# Patient Record
Sex: MALE | Race: BLACK OR AFRICAN AMERICAN | NOT HISPANIC OR LATINO | Employment: FULL TIME | URBAN - METROPOLITAN AREA
[De-identification: names, ages, dates, MRNs, and addresses within clinical notes are randomized per-mention and may not be internally consistent; named-entity substitution may affect disease eponyms.]

---

## 2017-09-29 ENCOUNTER — OFFICE VISIT (OUTPATIENT)
Dept: UROLOGY | Facility: CLINIC | Age: 57
End: 2017-09-29
Payer: COMMERCIAL

## 2017-09-29 VITALS
SYSTOLIC BLOOD PRESSURE: 137 MMHG | WEIGHT: 188.94 LBS | DIASTOLIC BLOOD PRESSURE: 80 MMHG | HEIGHT: 68 IN | BODY MASS INDEX: 28.63 KG/M2 | HEART RATE: 71 BPM

## 2017-09-29 DIAGNOSIS — R97.20 ELEVATED PSA: Primary | ICD-10-CM

## 2017-09-29 PROCEDURE — 99244 OFF/OP CNSLTJ NEW/EST MOD 40: CPT | Mod: S$GLB,,, | Performed by: UROLOGY

## 2017-09-29 PROCEDURE — 99999 PR PBB SHADOW E&M-EST. PATIENT-LVL III: CPT | Mod: PBBFAC,,, | Performed by: UROLOGY

## 2017-09-29 RX ORDER — CIPROFLOXACIN 500 MG/1
500 TABLET ORAL 2 TIMES DAILY
Qty: 4 TABLET | Refills: 0 | Status: SHIPPED | OUTPATIENT
Start: 2017-09-29 | End: 2017-10-01

## 2017-09-29 NOTE — PROGRESS NOTES
"Subjective:      Ashish Swift is a 57 y.o. male who was referred by Vi Valdivia NP  for evaluation of elevated PSA.      Elevated PSA  Patient is here with an elevated PSA. He has no family history of prostate cancer. He has no prior genitourinary history of hematuria, UTI, erectile dysfunction.    He states PSA has been high for several years, fluctuating between 6 and 8. He reports a prior biopsy in 2011 or 2012 that was negative.     Previous PSA values are :  Lab Results   Component Value Date    PSA 6.8 (H) 07/09/2015     The following portions of the patient's history were reviewed and updated as appropriate: allergies, current medications, past family history, past medical history, past social history, past surgical history and problem list.    Review of Systems  Constitutional: no fever or chills  ENT: no nasal congestion or sore throat  Respiratory: no cough or shortness of breath  Cardiovascular: no chest pain or palpitations  Gastrointestinal: no nausea or vomiting, tolerating diet  Genitourinary: as per HPI  Hematologic/Lymphatic: no easy bruising or lymphadenopathy  Musculoskeletal: no arthralgias or myalgias  Neurological: no seizures or tremors  Behavioral/Psych: no auditory or visual hallucinations     Objective:   Vitals: /80 (BP Location: Left arm, Patient Position: Sitting, BP Method: X-Large (Automatic))   Pulse 71   Ht 5' 7.5" (1.715 m)   Wt 85.7 kg (188 lb 15 oz)   BMI 29.15 kg/m²     Physical Exam   General: alert and oriented, no acute distress  Head: normocephalic, atraumatic  Neck: supple, no lymphadenopathy, normal ROM, no masses  Respiratory: Symmetric expansion, non-labored breathing  Cardiovascular: regular rate and rhythm, nomal pulses, no peripheral edema  Abdomen: soft, non tender, non distended, no palpable masses, no hernias, no hepatomegaly or splenomegaly  Genitourinary:   Penis: normal, no lesions, patent orthotopic meatus, no plaques  Scrotum: no rashes or " skin changes;   Testes: descended bilaterally, no masses, nontender, normal epididymides bilaterally, no hydroceles  Prostate: normal for age, normal consistency, non tender, no specific nodules, size: > 50 gm; seminal vesicles not palpated  Rectum: normal rectal tone, no rectal mass, normal perineum  Lymphatic: no inguinal nodes  Skin: normal coloration and turgor, no rashes, no suspicious skin lesions noted  Neuro: alert and oriented x3, no gross deficits  Psych: normal judgment and insight, normal mood/affect and non-anxious    Lab Review   Urinalysis demonstrates negative for all components  Lab Results   Component Value Date    WBC 4.99 09/09/2015    HGB 15.4 09/09/2015    HCT 44.7 09/09/2015    MCV 82 09/09/2015     09/09/2015     Lab Results   Component Value Date    CREATININE 1.2 12/18/2015    BUN 14 12/18/2015     PSA: 8.2 (8/23/17)    Assessment:     1. Elevated PSA        Plan:   We discussed this significance of his elevated PSA, various benign etiologies including BPH and infection, and the associated risk of prostate cancer.  Per the PCPT calculator, a prostate biopsy has a 19% chance of demonstrating high grade cancer, 14% chance of demonstrating low grade cancer, and 67% chance of being negative.      Based on this, the recommendation was made that he undergo prostate biopsy.  Other options were discussed as well, including observation. The risks and benefits of prostate biopsy were discussed, including bleeding, infection, and false negative results.    After this discussion, he has elected to proceed with prostate biopsy. He was provided instructions to avoid NSAIDs for 1 week prior to procedure and to take antibiotic beginning the day before (ordered today).

## 2017-09-29 NOTE — LETTER
September 29, 2017      Vi Valdivia, NP  [address] [facility] [address]           [place] [facility]  [address] [place] [address]  Phone: [phone]          Patient: Ashish Swift   MR Number: [id number]   YOB: 1960   Date of Visit: 9/29/2017       Dear Vi Valdivia:    Thank you for referring Ashish Swift to me for evaluation. Attached you will find relevant portions of my assessment and plan of care.    If you have questions, please do not hesitate to call me. I look forward to following Ashish Swift along with you.    Sincerely,    Junito Carmona MD    Enclosure  CC:  No Recipients    If you would like to receive this communication electronically, please contact [email or web address] or [phone] to request more information on [employer] Link access.    For providers and/or their staff who would like to refer a patient to [facility], please contact us through our one-stop-shop provider referral line, Gerardo Delgado, at [phone].    If you feel you have received this communication in error or would no longer like to receive these types of communications, please e-mail [email or web address]

## 2017-10-09 ENCOUNTER — PROCEDURE VISIT (OUTPATIENT)
Dept: UROLOGY | Facility: CLINIC | Age: 57
End: 2017-10-09
Attending: UROLOGY
Payer: COMMERCIAL

## 2017-10-09 VITALS
HEART RATE: 66 BPM | DIASTOLIC BLOOD PRESSURE: 80 MMHG | HEIGHT: 68 IN | WEIGHT: 186.31 LBS | SYSTOLIC BLOOD PRESSURE: 145 MMHG | BODY MASS INDEX: 28.24 KG/M2

## 2017-10-09 DIAGNOSIS — R97.20 ELEVATED PSA: ICD-10-CM

## 2017-10-09 LAB
BILIRUB SERPL-MCNC: ABNORMAL MG/DL
BLOOD URINE, POC: ABNORMAL
COLOR, POC UA: ABNORMAL
GLUCOSE UR QL STRIP: ABNORMAL
KETONES UR QL STRIP: NORMAL
LEUKOCYTE ESTERASE URINE, POC: ABNORMAL
NITRITE, POC UA: ABNORMAL
PH, POC UA: 7
PROTEIN, POC: NORMAL
SPECIFIC GRAVITY, POC UA: 1.01
UROBILINOGEN, POC UA: ABNORMAL

## 2017-10-09 PROCEDURE — 88305 TISSUE EXAM BY PATHOLOGIST: CPT | Mod: 26,,, | Performed by: PATHOLOGY

## 2017-10-09 PROCEDURE — 81002 URINALYSIS NONAUTO W/O SCOPE: CPT | Mod: S$GLB,,, | Performed by: UROLOGY

## 2017-10-09 PROCEDURE — 96372 THER/PROPH/DIAG INJ SC/IM: CPT | Mod: 59,S$GLB,, | Performed by: UROLOGY

## 2017-10-09 PROCEDURE — 76872 US TRANSRECTAL: CPT | Mod: S$GLB,,, | Performed by: UROLOGY

## 2017-10-09 PROCEDURE — 76942 ECHO GUIDE FOR BIOPSY: CPT | Mod: 59,S$GLB,, | Performed by: UROLOGY

## 2017-10-09 PROCEDURE — 88305 TISSUE EXAM BY PATHOLOGIST: CPT | Performed by: PATHOLOGY

## 2017-10-09 PROCEDURE — 55700 PR BIOPSY OF PROSTATE,NEEDLE/PUNCH: CPT | Mod: S$GLB,,, | Performed by: UROLOGY

## 2017-10-09 RX ORDER — GENTAMICIN SULFATE 40 MG/ML
80 INJECTION, SOLUTION INTRAMUSCULAR; INTRAVENOUS
Status: COMPLETED | OUTPATIENT
Start: 2017-10-09 | End: 2017-10-09

## 2017-10-09 RX ORDER — LIDOCAINE HYDROCHLORIDE 20 MG/ML
JELLY TOPICAL
Status: COMPLETED | OUTPATIENT
Start: 2017-10-09 | End: 2017-10-09

## 2017-10-09 RX ORDER — LIDOCAINE HYDROCHLORIDE 10 MG/ML
1 INJECTION INFILTRATION; PERINEURAL
Status: COMPLETED | OUTPATIENT
Start: 2017-10-09 | End: 2017-10-09

## 2017-10-09 RX ADMIN — LIDOCAINE HYDROCHLORIDE 5 ML: 20 JELLY TOPICAL at 11:10

## 2017-10-09 RX ADMIN — GENTAMICIN SULFATE 80 MG: 40 INJECTION, SOLUTION INTRAMUSCULAR; INTRAVENOUS at 11:10

## 2017-10-09 RX ADMIN — LIDOCAINE HYDROCHLORIDE 10 ML: 10 INJECTION INFILTRATION; PERINEURAL at 11:10

## 2017-10-09 NOTE — PROCEDURES
Transrectal Ultrasound w/ Biopsy  Date/Time: 10/9/2017 11:07 AM  Performed by: MARIANNE GALAVIZ  Authorized by: MARIANNE GALAVIZ     Consent Done?:  Yes (Written)  Indications: Elevated PSA    Indications comment:  6.8  Preparation: Patient was prepped and draped in usual sterile fashion    Position:  Left lateral  Anesthesia:  10cc's 1% Lidocaine  Patient sedated: No    Prostate Size:  54.8 cm^3 (PSAD 0.12)  Lesions:: No    Left Base Biopsies: 2  Left Mid Biopsies: 2  Left [place] Biopsies: 2  Right Base Biopsies: 2  Right Mid Biopsies: 2  Right [place] Biopsies: 2  Transitional zone: No    Total Biopsies:  12    Patient tolerance:  Patient tolerated the procedure well with no immediate complications    FU 1 week to review results

## 2017-10-16 ENCOUNTER — OFFICE VISIT (OUTPATIENT)
Dept: UROLOGY | Facility: CLINIC | Age: 57
End: 2017-10-16
Attending: UROLOGY
Payer: COMMERCIAL

## 2017-10-16 VITALS
DIASTOLIC BLOOD PRESSURE: 85 MMHG | HEIGHT: 68 IN | SYSTOLIC BLOOD PRESSURE: 140 MMHG | BODY MASS INDEX: 28.19 KG/M2 | HEART RATE: 69 BPM | WEIGHT: 186 LBS

## 2017-10-16 DIAGNOSIS — R97.20 ELEVATED PSA: Primary | ICD-10-CM

## 2017-10-16 PROCEDURE — 99213 OFFICE O/P EST LOW 20 MIN: CPT | Mod: S$GLB,,, | Performed by: UROLOGY

## 2017-10-16 NOTE — PROGRESS NOTES
"Subjective:      Ashish Swift is a 57 y.o. y.o. male who returns today regarding his elevated PSA.  He is s/p TRUS/biopsy on 10/9/2017 and is here to review results.  He has no c/o following procedure and no c/o today.    The following portions of the patient's history were reviewed and updated as appropriate: allergies, current medications, past family history, past medical history, past social history, past surgical history and problem list.       Objective:   Vitals: BP (!) 140/85 (BP Location: Right arm, Patient Position: Sitting, BP Method: Large (Automatic))   Pulse 69   Ht 5' 7.5" (1.715 m)   Wt 84.4 kg (186 lb)   BMI 28.70 kg/m²      Physical Exam   General: alert and oriented, no acute distress  Respiratory: Symmetric expansion, non-labored breathing  Neuro: no gross deficits  Psych: normal judgment and insight, normal mood/affect and non-anxious    Pathology  Prostate Biopsy: 12 cores all w/ NEM    Assessment and Plan:   Elevated PSA  -- Negative biopsy, which is his 2nd  -- Explained need for ongoing monitoring of his PSA given persistent elevation  -- Will proceed w/ MRI in the future if it remains high  -- FU 6 mos w/ PSA    I spent over 15 minutes with the patient. Over 50% of the visit was spent in counseling and coordination of care.   "

## 2019-12-09 ENCOUNTER — OFFICE VISIT (OUTPATIENT)
Dept: UROLOGY | Facility: CLINIC | Age: 59
End: 2019-12-09
Attending: UROLOGY
Payer: COMMERCIAL

## 2019-12-09 VITALS
HEART RATE: 70 BPM | BODY MASS INDEX: 28.2 KG/M2 | WEIGHT: 186.06 LBS | HEIGHT: 68 IN | DIASTOLIC BLOOD PRESSURE: 83 MMHG | SYSTOLIC BLOOD PRESSURE: 138 MMHG

## 2019-12-09 DIAGNOSIS — N40.1 PROSTATE HYPERPLASIA WITH URINARY OBSTRUCTION: ICD-10-CM

## 2019-12-09 DIAGNOSIS — N13.8 PROSTATE HYPERPLASIA WITH URINARY OBSTRUCTION: ICD-10-CM

## 2019-12-09 DIAGNOSIS — R97.20 ELEVATED PSA, LESS THAN 10 NG/ML: Primary | ICD-10-CM

## 2019-12-09 DIAGNOSIS — D49.59 NEOPLASM OF PROSTATE: ICD-10-CM

## 2019-12-09 PROCEDURE — 3008F BODY MASS INDEX DOCD: CPT | Mod: CPTII,S$GLB,, | Performed by: UROLOGY

## 2019-12-09 PROCEDURE — 3075F PR MOST RECENT SYSTOLIC BLOOD PRESS GE 130-139MM HG: ICD-10-PCS | Mod: CPTII,S$GLB,, | Performed by: UROLOGY

## 2019-12-09 PROCEDURE — 99214 PR OFFICE/OUTPT VISIT, EST, LEVL IV, 30-39 MIN: ICD-10-PCS | Mod: S$GLB,,, | Performed by: UROLOGY

## 2019-12-09 PROCEDURE — 3008F PR BODY MASS INDEX (BMI) DOCUMENTED: ICD-10-PCS | Mod: CPTII,S$GLB,, | Performed by: UROLOGY

## 2019-12-09 PROCEDURE — 3075F SYST BP GE 130 - 139MM HG: CPT | Mod: CPTII,S$GLB,, | Performed by: UROLOGY

## 2019-12-09 PROCEDURE — 3079F PR MOST RECENT DIASTOLIC BLOOD PRESSURE 80-89 MM HG: ICD-10-PCS | Mod: CPTII,S$GLB,, | Performed by: UROLOGY

## 2019-12-09 PROCEDURE — 99214 OFFICE O/P EST MOD 30 MIN: CPT | Mod: S$GLB,,, | Performed by: UROLOGY

## 2019-12-09 PROCEDURE — 3079F DIAST BP 80-89 MM HG: CPT | Mod: CPTII,S$GLB,, | Performed by: UROLOGY

## 2019-12-09 RX ORDER — LISINOPRIL 40 MG/1
TABLET ORAL
COMMUNITY
Start: 2019-12-04

## 2019-12-09 RX ORDER — SILDENAFIL 100 MG/1
TABLET, FILM COATED ORAL
Refills: 2 | COMMUNITY
Start: 2019-11-09

## 2019-12-09 RX ORDER — SILODOSIN 8 MG/1
8 CAPSULE ORAL DAILY
Qty: 30 CAPSULE | Refills: 11 | Status: SHIPPED | OUTPATIENT
Start: 2019-12-09 | End: 2020-11-12

## 2019-12-09 NOTE — PROGRESS NOTES
"Subjective:      Ashish Swift is a 59 y.o. male who was referred by Socorro Colvin NP  for evaluation of elevated PSA.      Elevated PSA  Patient is here with an elevated PSA. He has no family history of prostate cancer. He has no prior genitourinary history of hematuria, UTI, erectile dysfunction.    He states PSA has been high for several years, fluctuating between 6 and 8.     He reports a prior biopsy in 2011 or 2012 that was negative. He also had a prostate biopsy 10/2019 which was negative. This was done due to PSA 8.2.    He also c/o LUTS. Nocturia x5-6/night. When he fluid restricts before bed, he only goes 2x/night. Some daytime frequency. Has to strain every now and then. Strong FOS. He states he is able to hold his urine when he has the urge.     Has tried flomax in the past, but states that it dropped his blood pressure.      Previous PSA values are :  Lab Results   Component Value Date    PSA 6.8 (H) 07/09/2015     The following portions of the patient's history were reviewed and updated as appropriate: allergies, current medications, past family history, past medical history, past social history, past surgical history and problem list.    Review of Systems  Constitutional: no fever or chills  ENT: no nasal congestion or sore throat  Respiratory: no cough or shortness of breath  Cardiovascular: no chest pain or palpitations  Gastrointestinal: no nausea or vomiting, tolerating diet  Genitourinary: as per HPI  Hematologic/Lymphatic: no easy bruising or lymphadenopathy  Musculoskeletal: no arthralgias or myalgias  Neurological: no seizures or tremors  Behavioral/Psych: no auditory or visual hallucinations     Objective:   Vitals: /83 (BP Location: Left arm, Patient Position: Sitting, BP Method: Large (Automatic))   Pulse 70   Ht 5' 7.5" (1.715 m)   Wt 84.4 kg (186 lb 1.1 oz)   BMI 28.71 kg/m²     Physical Exam   General: alert and oriented, no acute distress  Head: normocephalic, " atraumatic  Neck: supple, no lymphadenopathy, normal ROM, no masses  Respiratory: Symmetric expansion, non-labored breathing  Cardiovascular: regular rate and rhythm, nomal pulses, no peripheral edema  Abdomen: soft, non tender, non distended, no palpable masses, no hernias, no hepatomegaly or splenomegaly  Genitourinary:   Prostate: normal for age, normal consistency, non tender, no specific nodules, size: > 50 gm; seminal vesicles not palpated  Rectum: normal rectal tone, no rectal mass, normal perineum  Lymphatic: no inguinal nodes  Skin: normal coloration and turgor, no rashes, no suspicious skin lesions noted  Neuro: alert and oriented x3, no gross deficits  Psych: normal judgment and insight, normal mood/affect and non-anxious    Lab Review   Urinalysis demonstrates negative for all components  Lab Results   Component Value Date    WBC 4.99 09/09/2015    HGB 15.4 09/09/2015    HCT 44.7 09/09/2015    MCV 82 09/09/2015     09/09/2015     Lab Results   Component Value Date    CREATININE 1.2 12/18/2015    BUN 14 12/18/2015     PSA: 9.6 (11/20/19)    Assessment:     1. Elevated PSA, less than 10 ng/ml    2. Prostate hyperplasia with urinary obstruction        Plan:   -- PSA still rising, s/p 2 negative biopsies  -- will obtain prostate MRI, will call with results   -- we discussed that if the results are concerning, he will need another biopsy  -- would like to try Rapaflo, less reported side effects, rx sent to pharmacy  -- discussed that if he feels dizzy or lightheaded, he must stop the Rapaflo - he acknowledged

## 2019-12-09 NOTE — LETTER
December 9, 2019      Socorro Colvin NP  [address] [facility] [place] [address]           [facility] [address] [address]  Phone: [phone]  Fax: [phone]          Patient: Ashish Swift   MR Number: [id number]   YOB: 1960   Date of Visit: 12/9/2019       Dear Socorro Colvin:    Thank you for referring Ashish Swift to me for evaluation. Attached you will find relevant portions of my assessment and plan of care.    If you have questions, please do not hesitate to call me. I look forward to following Ashish Swift along with you.    Sincerely,    Junito Carmona MD    Enclosure  CC:  No Recipients    If you would like to receive this communication electronically, please contact [email or web address] or [phone] to request more information on [employer] Link access.    For providers and/or their staff who would like to refer a patient to [facility], please contact us through our one-stop-shop provider referral line, [facility] , at [phone].    If you feel you have received this communication in error or would no longer like to receive these types of communications, please e-mail [email or web address]

## 2020-11-12 ENCOUNTER — OFFICE VISIT (OUTPATIENT)
Dept: UROLOGY | Facility: CLINIC | Age: 60
End: 2020-11-12
Attending: UROLOGY
Payer: COMMERCIAL

## 2020-11-12 VITALS
DIASTOLIC BLOOD PRESSURE: 80 MMHG | HEART RATE: 85 BPM | SYSTOLIC BLOOD PRESSURE: 145 MMHG | WEIGHT: 186 LBS | HEIGHT: 68 IN | BODY MASS INDEX: 28.19 KG/M2

## 2020-11-12 DIAGNOSIS — R97.20 ELEVATED PSA, LESS THAN 10 NG/ML: Primary | ICD-10-CM

## 2020-11-12 PROCEDURE — 1126F AMNT PAIN NOTED NONE PRSNT: CPT | Mod: S$GLB,,, | Performed by: UROLOGY

## 2020-11-12 PROCEDURE — 3079F PR MOST RECENT DIASTOLIC BLOOD PRESSURE 80-89 MM HG: ICD-10-PCS | Mod: CPTII,S$GLB,, | Performed by: UROLOGY

## 2020-11-12 PROCEDURE — 99214 PR OFFICE/OUTPT VISIT, EST, LEVL IV, 30-39 MIN: ICD-10-PCS | Mod: S$GLB,,, | Performed by: UROLOGY

## 2020-11-12 PROCEDURE — 3079F DIAST BP 80-89 MM HG: CPT | Mod: CPTII,S$GLB,, | Performed by: UROLOGY

## 2020-11-12 PROCEDURE — 3077F SYST BP >= 140 MM HG: CPT | Mod: CPTII,S$GLB,, | Performed by: UROLOGY

## 2020-11-12 PROCEDURE — 3008F PR BODY MASS INDEX (BMI) DOCUMENTED: ICD-10-PCS | Mod: CPTII,S$GLB,, | Performed by: UROLOGY

## 2020-11-12 PROCEDURE — 3008F BODY MASS INDEX DOCD: CPT | Mod: CPTII,S$GLB,, | Performed by: UROLOGY

## 2020-11-12 PROCEDURE — 3077F PR MOST RECENT SYSTOLIC BLOOD PRESSURE >= 140 MM HG: ICD-10-PCS | Mod: CPTII,S$GLB,, | Performed by: UROLOGY

## 2020-11-12 PROCEDURE — 1126F PR PAIN SEVERITY QUANTIFIED, NO PAIN PRESENT: ICD-10-PCS | Mod: S$GLB,,, | Performed by: UROLOGY

## 2020-11-12 PROCEDURE — 99214 OFFICE O/P EST MOD 30 MIN: CPT | Mod: S$GLB,,, | Performed by: UROLOGY

## 2020-11-12 NOTE — PROGRESS NOTES
"Subjective:      Ashish Swift is a 60 y.o. male who returns today regarding his elevated PSA.    He states PSA has been high for several years, fluctuating between 6 and 8.      He reports a prior biopsy in 2011 or 2012 that was negative. He also had a prostate biopsy 10/2019 which was negative. This was done due to PSA 8.2.     Today he is doing well. He has no voiding c/o. He states his PCP recently checked his PSA and it was around 4.    The following portions of the patient's history were reviewed and updated as appropriate: allergies, current medications, past family history, past medical history, past social history, past surgical history and problem list.    Review of Systems  A comprehensive multipoint review of systems was negative except as otherwise stated in the HPI.     Objective:   Vitals: BP (!) 145/80   Pulse 85   Ht 5' 7.5" (1.715 m)   Wt 84.4 kg (186 lb)   BMI 28.70 kg/m²     Physical Exam   General: alert and oriented, no acute distress  Respiratory: Symmetric expansion, non-labored breathing  Genitourinary: no penile lesions or discharge, no testicular masses, normal scrotum  Rectal: the prostate is 60 gms and without nodules and normal rectal tone  Skin: normal coloration and turgor, no rashes, no suspicious skin lesions noted  Neuro: no gross deficits  Psych: normal judgment and insight, normal mood/affect and non-anxious    Lab Review   Urinalysis demonstrates negative for all components  Lab Results   Component Value Date    WBC 4.99 09/09/2015    HGB 15.4 09/09/2015    HCT 44.7 09/09/2015    MCV 82 09/09/2015     09/09/2015     Lab Results   Component Value Date    CREATININE 1.2 12/18/2015    BUN 14 12/18/2015     Lab Results   Component Value Date    PSA 6.8 (H) 07/09/2015     Assessment and Plan:   1. Elevated PSA, less than 10 ng/ml  -- PSA improved per his report; 2 prior negative biopsies  -- FU 12 mos     "

## 2021-10-13 ENCOUNTER — TELEPHONE (OUTPATIENT)
Dept: NEPHROLOGY | Facility: CLINIC | Age: 61
End: 2021-10-13

## 2021-10-13 DIAGNOSIS — N28.9 ABNORMAL KIDNEY FUNCTION: Primary | ICD-10-CM

## 2021-10-20 ENCOUNTER — LAB VISIT (OUTPATIENT)
Dept: LAB | Facility: HOSPITAL | Age: 61
End: 2021-10-20
Payer: COMMERCIAL

## 2021-10-20 DIAGNOSIS — N28.9 ABNORMAL KIDNEY FUNCTION: ICD-10-CM

## 2021-10-20 LAB
ANION GAP SERPL CALC-SCNC: 8 MMOL/L (ref 8–16)
BASOPHILS # BLD AUTO: 0.02 K/UL (ref 0–0.2)
BASOPHILS NFR BLD: 0.4 % (ref 0–1.9)
BUN SERPL-MCNC: 12 MG/DL (ref 8–23)
CALCIUM SERPL-MCNC: 9.1 MG/DL (ref 8.7–10.5)
CHLORIDE SERPL-SCNC: 106 MMOL/L (ref 95–110)
CO2 SERPL-SCNC: 27 MMOL/L (ref 23–29)
CREAT SERPL-MCNC: 1.2 MG/DL (ref 0.5–1.4)
DIFFERENTIAL METHOD: ABNORMAL
EOSINOPHIL # BLD AUTO: 0.1 K/UL (ref 0–0.5)
EOSINOPHIL NFR BLD: 1.6 % (ref 0–8)
ERYTHROCYTE [DISTWIDTH] IN BLOOD BY AUTOMATED COUNT: 14.6 % (ref 11.5–14.5)
EST. GFR  (AFRICAN AMERICAN): >60 ML/MIN/1.73 M^2
EST. GFR  (NON AFRICAN AMERICAN): >60 ML/MIN/1.73 M^2
GLUCOSE SERPL-MCNC: 71 MG/DL (ref 70–110)
HCT VFR BLD AUTO: 41.9 % (ref 40–54)
HGB BLD-MCNC: 13.7 G/DL (ref 14–18)
IMM GRANULOCYTES # BLD AUTO: 0.01 K/UL (ref 0–0.04)
IMM GRANULOCYTES NFR BLD AUTO: 0.2 % (ref 0–0.5)
LYMPHOCYTES # BLD AUTO: 2.1 K/UL (ref 1–4.8)
LYMPHOCYTES NFR BLD: 37.4 % (ref 18–48)
MCH RBC QN AUTO: 27.3 PG (ref 27–31)
MCHC RBC AUTO-ENTMCNC: 32.7 G/DL (ref 32–36)
MCV RBC AUTO: 84 FL (ref 82–98)
MONOCYTES # BLD AUTO: 0.7 K/UL (ref 0.3–1)
MONOCYTES NFR BLD: 13 % (ref 4–15)
NEUTROPHILS # BLD AUTO: 2.7 K/UL (ref 1.8–7.7)
NEUTROPHILS NFR BLD: 47.4 % (ref 38–73)
NRBC BLD-RTO: 0 /100 WBC
PLATELET # BLD AUTO: 280 K/UL (ref 150–450)
PMV BLD AUTO: 9.9 FL (ref 9.2–12.9)
POTASSIUM SERPL-SCNC: 3.7 MMOL/L (ref 3.5–5.1)
RBC # BLD AUTO: 5.02 M/UL (ref 4.6–6.2)
SODIUM SERPL-SCNC: 141 MMOL/L (ref 136–145)
WBC # BLD AUTO: 5.61 K/UL (ref 3.9–12.7)

## 2021-10-20 PROCEDURE — 36415 COLL VENOUS BLD VENIPUNCTURE: CPT | Performed by: INTERNAL MEDICINE

## 2021-10-20 PROCEDURE — 85025 COMPLETE CBC W/AUTO DIFF WBC: CPT | Performed by: INTERNAL MEDICINE

## 2021-10-20 PROCEDURE — 80048 BASIC METABOLIC PNL TOTAL CA: CPT | Performed by: INTERNAL MEDICINE

## 2021-11-16 ENCOUNTER — OFFICE VISIT (OUTPATIENT)
Dept: NEPHROLOGY | Facility: CLINIC | Age: 61
End: 2021-11-16
Payer: COMMERCIAL

## 2021-11-16 VITALS
BODY MASS INDEX: 26.53 KG/M2 | HEART RATE: 67 BPM | SYSTOLIC BLOOD PRESSURE: 130 MMHG | DIASTOLIC BLOOD PRESSURE: 80 MMHG | WEIGHT: 175.06 LBS | HEIGHT: 68 IN | OXYGEN SATURATION: 98 %

## 2021-11-16 DIAGNOSIS — R79.89 ELEVATED SERUM CREATININE: Primary | ICD-10-CM

## 2021-11-16 PROCEDURE — 1159F PR MEDICATION LIST DOCUMENTED IN MEDICAL RECORD: ICD-10-PCS | Mod: CPTII,S$GLB,, | Performed by: INTERNAL MEDICINE

## 2021-11-16 PROCEDURE — 3075F SYST BP GE 130 - 139MM HG: CPT | Mod: CPTII,S$GLB,, | Performed by: INTERNAL MEDICINE

## 2021-11-16 PROCEDURE — 1159F MED LIST DOCD IN RCRD: CPT | Mod: CPTII,S$GLB,, | Performed by: INTERNAL MEDICINE

## 2021-11-16 PROCEDURE — 3066F NEPHROPATHY DOC TX: CPT | Mod: CPTII,S$GLB,, | Performed by: INTERNAL MEDICINE

## 2021-11-16 PROCEDURE — 99203 OFFICE O/P NEW LOW 30 MIN: CPT | Mod: S$GLB,,, | Performed by: INTERNAL MEDICINE

## 2021-11-16 PROCEDURE — 99999 PR PBB SHADOW E&M-EST. PATIENT-LVL III: ICD-10-PCS | Mod: PBBFAC,,, | Performed by: INTERNAL MEDICINE

## 2021-11-16 PROCEDURE — 99203 PR OFFICE/OUTPT VISIT, NEW, LEVL III, 30-44 MIN: ICD-10-PCS | Mod: S$GLB,,, | Performed by: INTERNAL MEDICINE

## 2021-11-16 PROCEDURE — 3075F PR MOST RECENT SYSTOLIC BLOOD PRESS GE 130-139MM HG: ICD-10-PCS | Mod: CPTII,S$GLB,, | Performed by: INTERNAL MEDICINE

## 2021-11-16 PROCEDURE — 4010F PR ACE/ARB THEARPY RXD/TAKEN: ICD-10-PCS | Mod: CPTII,S$GLB,, | Performed by: INTERNAL MEDICINE

## 2021-11-16 PROCEDURE — 3079F PR MOST RECENT DIASTOLIC BLOOD PRESSURE 80-89 MM HG: ICD-10-PCS | Mod: CPTII,S$GLB,, | Performed by: INTERNAL MEDICINE

## 2021-11-16 PROCEDURE — 3079F DIAST BP 80-89 MM HG: CPT | Mod: CPTII,S$GLB,, | Performed by: INTERNAL MEDICINE

## 2021-11-16 PROCEDURE — 99999 PR PBB SHADOW E&M-EST. PATIENT-LVL III: CPT | Mod: PBBFAC,,, | Performed by: INTERNAL MEDICINE

## 2021-11-16 PROCEDURE — 3008F PR BODY MASS INDEX (BMI) DOCUMENTED: ICD-10-PCS | Mod: CPTII,S$GLB,, | Performed by: INTERNAL MEDICINE

## 2021-11-16 PROCEDURE — 4010F ACE/ARB THERAPY RXD/TAKEN: CPT | Mod: CPTII,S$GLB,, | Performed by: INTERNAL MEDICINE

## 2021-11-16 PROCEDURE — 3066F PR DOCUMENTATION OF TREATMENT FOR NEPHROPATHY: ICD-10-PCS | Mod: CPTII,S$GLB,, | Performed by: INTERNAL MEDICINE

## 2021-11-16 PROCEDURE — 3008F BODY MASS INDEX DOCD: CPT | Mod: CPTII,S$GLB,, | Performed by: INTERNAL MEDICINE

## 2021-11-18 ENCOUNTER — OFFICE VISIT (OUTPATIENT)
Dept: UROLOGY | Facility: CLINIC | Age: 61
End: 2021-11-18
Attending: UROLOGY
Payer: COMMERCIAL

## 2021-11-18 VITALS
SYSTOLIC BLOOD PRESSURE: 146 MMHG | HEIGHT: 67 IN | WEIGHT: 175.06 LBS | DIASTOLIC BLOOD PRESSURE: 75 MMHG | BODY MASS INDEX: 27.48 KG/M2 | HEART RATE: 75 BPM

## 2021-11-18 DIAGNOSIS — R97.20 ELEVATED PSA: Primary | ICD-10-CM

## 2021-11-18 PROCEDURE — 1159F MED LIST DOCD IN RCRD: CPT | Mod: CPTII,S$GLB,, | Performed by: UROLOGY

## 2021-11-18 PROCEDURE — 3066F NEPHROPATHY DOC TX: CPT | Mod: CPTII,S$GLB,, | Performed by: UROLOGY

## 2021-11-18 PROCEDURE — 3077F PR MOST RECENT SYSTOLIC BLOOD PRESSURE >= 140 MM HG: ICD-10-PCS | Mod: CPTII,S$GLB,, | Performed by: UROLOGY

## 2021-11-18 PROCEDURE — 1160F RVW MEDS BY RX/DR IN RCRD: CPT | Mod: CPTII,S$GLB,, | Performed by: UROLOGY

## 2021-11-18 PROCEDURE — 1159F PR MEDICATION LIST DOCUMENTED IN MEDICAL RECORD: ICD-10-PCS | Mod: CPTII,S$GLB,, | Performed by: UROLOGY

## 2021-11-18 PROCEDURE — 3008F PR BODY MASS INDEX (BMI) DOCUMENTED: ICD-10-PCS | Mod: CPTII,S$GLB,, | Performed by: UROLOGY

## 2021-11-18 PROCEDURE — 3066F PR DOCUMENTATION OF TREATMENT FOR NEPHROPATHY: ICD-10-PCS | Mod: CPTII,S$GLB,, | Performed by: UROLOGY

## 2021-11-18 PROCEDURE — 3078F PR MOST RECENT DIASTOLIC BLOOD PRESSURE < 80 MM HG: ICD-10-PCS | Mod: CPTII,S$GLB,, | Performed by: UROLOGY

## 2021-11-18 PROCEDURE — 3077F SYST BP >= 140 MM HG: CPT | Mod: CPTII,S$GLB,, | Performed by: UROLOGY

## 2021-11-18 PROCEDURE — 4010F ACE/ARB THERAPY RXD/TAKEN: CPT | Mod: CPTII,S$GLB,, | Performed by: UROLOGY

## 2021-11-18 PROCEDURE — 1160F PR REVIEW ALL MEDS BY PRESCRIBER/CLIN PHARMACIST DOCUMENTED: ICD-10-PCS | Mod: CPTII,S$GLB,, | Performed by: UROLOGY

## 2021-11-18 PROCEDURE — 99214 OFFICE O/P EST MOD 30 MIN: CPT | Mod: S$GLB,,, | Performed by: UROLOGY

## 2021-11-18 PROCEDURE — 4010F PR ACE/ARB THEARPY RXD/TAKEN: ICD-10-PCS | Mod: CPTII,S$GLB,, | Performed by: UROLOGY

## 2021-11-18 PROCEDURE — 99214 PR OFFICE/OUTPT VISIT, EST, LEVL IV, 30-39 MIN: ICD-10-PCS | Mod: S$GLB,,, | Performed by: UROLOGY

## 2021-11-18 PROCEDURE — 3078F DIAST BP <80 MM HG: CPT | Mod: CPTII,S$GLB,, | Performed by: UROLOGY

## 2021-11-18 PROCEDURE — 3008F BODY MASS INDEX DOCD: CPT | Mod: CPTII,S$GLB,, | Performed by: UROLOGY

## 2021-11-18 RX ORDER — POLYETHYLENE GLYCOL 3350, SODIUM SULFATE, SODIUM CHLORIDE, POTASSIUM CHLORIDE, ASCORBIC ACID, SODIUM ASCORBATE 140-9-5.2G
KIT ORAL
COMMUNITY
Start: 2021-08-09

## 2021-11-18 RX ORDER — LISINOPRIL 40 MG/1
40 TABLET ORAL DAILY
COMMUNITY
Start: 2021-06-15

## 2021-11-18 RX ORDER — BUTALBITAL, ACETAMINOPHEN AND CAFFEINE 50; 325; 40 MG/1; MG/1; MG/1
1 TABLET ORAL EVERY 4 HOURS PRN
COMMUNITY
Start: 2021-10-23

## 2022-10-31 ENCOUNTER — OFFICE VISIT (OUTPATIENT)
Dept: UROLOGY | Facility: CLINIC | Age: 62
End: 2022-10-31
Attending: UROLOGY
Payer: COMMERCIAL

## 2022-10-31 VITALS — HEART RATE: 72 BPM | SYSTOLIC BLOOD PRESSURE: 164 MMHG | DIASTOLIC BLOOD PRESSURE: 78 MMHG

## 2022-10-31 DIAGNOSIS — R97.20 ELEVATED PSA: Primary | ICD-10-CM

## 2022-10-31 PROCEDURE — 3077F PR MOST RECENT SYSTOLIC BLOOD PRESSURE >= 140 MM HG: ICD-10-PCS | Mod: CPTII,S$GLB,, | Performed by: UROLOGY

## 2022-10-31 PROCEDURE — 99214 PR OFFICE/OUTPT VISIT, EST, LEVL IV, 30-39 MIN: ICD-10-PCS | Mod: S$GLB,,, | Performed by: UROLOGY

## 2022-10-31 PROCEDURE — 99214 OFFICE O/P EST MOD 30 MIN: CPT | Mod: S$GLB,,, | Performed by: UROLOGY

## 2022-10-31 PROCEDURE — 3078F DIAST BP <80 MM HG: CPT | Mod: CPTII,S$GLB,, | Performed by: UROLOGY

## 2022-10-31 PROCEDURE — 3077F SYST BP >= 140 MM HG: CPT | Mod: CPTII,S$GLB,, | Performed by: UROLOGY

## 2022-10-31 PROCEDURE — 1159F MED LIST DOCD IN RCRD: CPT | Mod: CPTII,S$GLB,, | Performed by: UROLOGY

## 2022-10-31 PROCEDURE — 3078F PR MOST RECENT DIASTOLIC BLOOD PRESSURE < 80 MM HG: ICD-10-PCS | Mod: CPTII,S$GLB,, | Performed by: UROLOGY

## 2022-10-31 PROCEDURE — 4010F ACE/ARB THERAPY RXD/TAKEN: CPT | Mod: CPTII,S$GLB,, | Performed by: UROLOGY

## 2022-10-31 PROCEDURE — 4010F PR ACE/ARB THEARPY RXD/TAKEN: ICD-10-PCS | Mod: CPTII,S$GLB,, | Performed by: UROLOGY

## 2022-10-31 PROCEDURE — 1160F PR REVIEW ALL MEDS BY PRESCRIBER/CLIN PHARMACIST DOCUMENTED: ICD-10-PCS | Mod: CPTII,S$GLB,, | Performed by: UROLOGY

## 2022-10-31 PROCEDURE — 1159F PR MEDICATION LIST DOCUMENTED IN MEDICAL RECORD: ICD-10-PCS | Mod: CPTII,S$GLB,, | Performed by: UROLOGY

## 2022-10-31 PROCEDURE — 1160F RVW MEDS BY RX/DR IN RCRD: CPT | Mod: CPTII,S$GLB,, | Performed by: UROLOGY

## 2022-10-31 NOTE — PROGRESS NOTES
Subjective:      Ashish Swift is a 62 y.o. male who returns today regarding his elevated PSA.    He states PSA has been high for several years, fluctuating between 6 and 8. His PSA is normally checked by PCP and records haven't always been available.     He reports a prior biopsy in 2011 or 2012 that was negative. He also had a prostate biopsy 10/2017 which was negative. This was done due to PSA 8.2.     Today he is doing well. Reports some difficulty urinating and hemorrhoids. Nocturia x1-3. Denies urgency. He is not currently taking Flomax or Finasteride. PVR 22. He has never had a prostate MRI. AUA SS 12/2. Denies night sweats, bone pain, weight loss, hematuria, dysuria.    The following portions of the patient's history were reviewed and updated as appropriate: allergies, current medications, past family history, past medical history, past social history, past surgical history and problem list.    Review of Systems  A comprehensive multipoint review of systems was negative except as otherwise stated in the HPI.     Objective:   Vitals: There were no vitals taken for this visit.    Physical Exam   General: alert and oriented, no acute distress  Respiratory: Symmetric expansion, non-labored breathing  Genitourinary: no penile lesions or discharge, no testicular masses, normal scrotum  Rectal: the prostate is 60g gms and without nodules and normal rectal tone with tender hemorrhoids  Skin: normal coloration and turgor, no rashes, no suspicious skin lesions noted  Neuro: no gross deficits  Psych: normal judgment and insight, normal mood/affect and non-anxious    Lab Review   Urinalysis demonstrates negative for all components  Lab Results   Component Value Date    WBC 5.61 10/20/2021    HGB 13.7 (L) 10/20/2021    HCT 41.9 10/20/2021    MCV 84 10/20/2021     10/20/2021     Lab Results   Component Value Date    CREATININE 1.2 10/20/2021    BUN 12 10/20/2021     PSA: 12.4 (Sept 2021)  PSA: 20.2  (10/10/22)    Assessment and Plan:   1. Elevated PSA  -- Further increased now concerning for malignancy  -- Recommend Prostate MRI and likely biopsy  -- FU pending above

## 2024-08-12 ENCOUNTER — HOSPITAL ENCOUNTER (EMERGENCY)
Facility: HOSPITAL | Age: 64
Discharge: HOME | End: 2024-08-12
Attending: STUDENT IN AN ORGANIZED HEALTH CARE EDUCATION/TRAINING PROGRAM
Payer: COMMERCIAL

## 2024-08-12 VITALS
WEIGHT: 160 LBS | BODY MASS INDEX: 24.25 KG/M2 | RESPIRATION RATE: 18 BRPM | TEMPERATURE: 99 F | HEIGHT: 68 IN | HEART RATE: 79 BPM | OXYGEN SATURATION: 98 % | SYSTOLIC BLOOD PRESSURE: 131 MMHG | DIASTOLIC BLOOD PRESSURE: 73 MMHG

## 2024-08-12 DIAGNOSIS — R31.9 URINARY TRACT INFECTION WITH HEMATURIA, SITE UNSPECIFIED: ICD-10-CM

## 2024-08-12 DIAGNOSIS — N39.0 URINARY TRACT INFECTION WITH HEMATURIA, SITE UNSPECIFIED: ICD-10-CM

## 2024-08-12 DIAGNOSIS — T83.9XXA PROBLEM WITH FOLEY CATHETER, INITIAL ENCOUNTER: Primary | ICD-10-CM

## 2024-08-12 LAB
ALBUMIN SERPL BCP-MCNC: 3.6 G/DL (ref 3.5–5.2)
ALP SERPL-CCNC: 45 U/L (ref 55–135)
ALT SERPL W/O P-5'-P-CCNC: 25 U/L (ref 10–44)
ANION GAP SERPL CALC-SCNC: 10 MMOL/L (ref 8–16)
AST SERPL-CCNC: 17 U/L (ref 10–40)
BACTERIA #/AREA URNS AUTO: ABNORMAL /HPF
BASOPHILS # BLD AUTO: 0.03 K/UL (ref 0–0.2)
BASOPHILS NFR BLD: 0.5 % (ref 0–1.9)
BILIRUB SERPL-MCNC: 0.3 MG/DL (ref 0.1–1)
BILIRUB UR QL STRIP: NEGATIVE
BUN SERPL-MCNC: 14 MG/DL (ref 8–23)
CALCIUM SERPL-MCNC: 9.1 MG/DL (ref 8.7–10.5)
CHLORIDE SERPL-SCNC: 108 MMOL/L (ref 95–110)
CLARITY UR REFRACT.AUTO: ABNORMAL
CO2 SERPL-SCNC: 28 MMOL/L (ref 23–29)
COLOR UR AUTO: ABNORMAL
CREAT SERPL-MCNC: 1.1 MG/DL (ref 0.5–1.4)
DIFFERENTIAL METHOD BLD: ABNORMAL
EOSINOPHIL # BLD AUTO: 0.2 K/UL (ref 0–0.5)
EOSINOPHIL NFR BLD: 3.9 % (ref 0–8)
ERYTHROCYTE [DISTWIDTH] IN BLOOD BY AUTOMATED COUNT: 15.2 % (ref 11.5–14.5)
EST. GFR  (NO RACE VARIABLE): >60 ML/MIN/1.73 M^2
GLUCOSE SERPL-MCNC: 84 MG/DL (ref 70–110)
GLUCOSE UR QL STRIP: NEGATIVE
HCT VFR BLD AUTO: 37.2 % (ref 40–54)
HGB BLD-MCNC: 11.8 G/DL (ref 14–18)
HGB UR QL STRIP: ABNORMAL
IMM GRANULOCYTES # BLD AUTO: 0.02 K/UL (ref 0–0.04)
IMM GRANULOCYTES NFR BLD AUTO: 0.3 % (ref 0–0.5)
KETONES UR QL STRIP: NEGATIVE
LEUKOCYTE ESTERASE UR QL STRIP: NEGATIVE
LYMPHOCYTES # BLD AUTO: 1.7 K/UL (ref 1–4.8)
LYMPHOCYTES NFR BLD: 28.2 % (ref 18–48)
MCH RBC QN AUTO: 27.7 PG (ref 27–31)
MCHC RBC AUTO-ENTMCNC: 31.7 G/DL (ref 32–36)
MCV RBC AUTO: 87 FL (ref 82–98)
MICROSCOPIC COMMENT: ABNORMAL
MONOCYTES # BLD AUTO: 0.9 K/UL (ref 0.3–1)
MONOCYTES NFR BLD: 14.2 % (ref 4–15)
NEUTROPHILS # BLD AUTO: 3.2 K/UL (ref 1.8–7.7)
NEUTROPHILS NFR BLD: 52.9 % (ref 38–73)
NITRITE UR QL STRIP: NEGATIVE
NRBC BLD-RTO: 0 /100 WBC
PH UR STRIP: 8 [PH] (ref 5–8)
PLATELET # BLD AUTO: 346 K/UL (ref 150–450)
PMV BLD AUTO: 9.5 FL (ref 9.2–12.9)
POTASSIUM SERPL-SCNC: 4 MMOL/L (ref 3.5–5.1)
PROT SERPL-MCNC: 6.6 G/DL (ref 6–8.4)
PROT UR QL STRIP: ABNORMAL
RBC # BLD AUTO: 4.26 M/UL (ref 4.6–6.2)
RBC #/AREA URNS AUTO: >100 /HPF (ref 0–4)
SODIUM SERPL-SCNC: 146 MMOL/L (ref 136–145)
SP GR UR STRIP: 1.01 (ref 1–1.03)
URN SPEC COLLECT METH UR: ABNORMAL
WBC # BLD AUTO: 6.11 K/UL (ref 3.9–12.7)
WBC #/AREA URNS AUTO: 11 /HPF (ref 0–5)

## 2024-08-12 PROCEDURE — 81001 URINALYSIS AUTO W/SCOPE: CPT | Performed by: EMERGENCY MEDICINE

## 2024-08-12 PROCEDURE — 87086 URINE CULTURE/COLONY COUNT: CPT | Performed by: EMERGENCY MEDICINE

## 2024-08-12 PROCEDURE — 99283 EMERGENCY DEPT VISIT LOW MDM: CPT

## 2024-08-12 PROCEDURE — 85025 COMPLETE CBC W/AUTO DIFF WBC: CPT | Performed by: EMERGENCY MEDICINE

## 2024-08-12 PROCEDURE — 80053 COMPREHEN METABOLIC PANEL: CPT | Performed by: EMERGENCY MEDICINE

## 2024-08-12 RX ORDER — CEPHALEXIN 500 MG/1
500 CAPSULE ORAL EVERY 12 HOURS
Qty: 14 CAPSULE | Refills: 0 | Status: SHIPPED | OUTPATIENT
Start: 2024-08-12 | End: 2024-08-13 | Stop reason: SDUPTHER

## 2024-08-12 NOTE — FIRST PROVIDER EVALUATION
"Medical screening examination initiated.  I have conducted a focused provider triage encounter, findings are as follows:    Brief history of present illness:      Jefferson catheter leaking  Placed 2-3 weeks ago at   Severe BPH  Urology to place catheter over wire there    Vitals:    08/12/24 1751   BP: (!) 106/59   Pulse: 78   Resp: 20   Temp: 98.8 °F (37.1 °C)   TempSrc: Oral   SpO2: 98%   Weight: 72.6 kg (160 lb)   Height: 5' 8" (1.727 m)       Pertinent physical exam:  Well appearing    Brief workup plan:  Per primary team    Preliminary workup initiated; this workup will be continued and followed by the physician or advanced practice provider that is assigned to the patient when roomed.  "

## 2024-08-13 ENCOUNTER — TELEPHONE (OUTPATIENT)
Dept: UROLOGY | Facility: CLINIC | Age: 64
End: 2024-08-13

## 2024-08-13 ENCOUNTER — OFFICE VISIT (OUTPATIENT)
Dept: UROLOGY | Facility: CLINIC | Age: 64
End: 2024-08-13
Payer: COMMERCIAL

## 2024-08-13 VITALS — WEIGHT: 154.31 LBS | BODY MASS INDEX: 23.46 KG/M2

## 2024-08-13 DIAGNOSIS — T83.9XXA PROBLEM WITH FOLEY CATHETER, INITIAL ENCOUNTER: ICD-10-CM

## 2024-08-13 DIAGNOSIS — N40.1 BPH WITH URINARY OBSTRUCTION: Primary | ICD-10-CM

## 2024-08-13 DIAGNOSIS — R97.20 ELEVATED PSA: ICD-10-CM

## 2024-08-13 DIAGNOSIS — N13.8 BPH WITH URINARY OBSTRUCTION: Primary | ICD-10-CM

## 2024-08-13 DIAGNOSIS — T83.511A URINARY TRACT INFECTION ASSOCIATED WITH INDWELLING URETHRAL CATHETER, INITIAL ENCOUNTER: ICD-10-CM

## 2024-08-13 DIAGNOSIS — N39.0 URINARY TRACT INFECTION WITH HEMATURIA, SITE UNSPECIFIED: ICD-10-CM

## 2024-08-13 DIAGNOSIS — N39.0 URINARY TRACT INFECTION ASSOCIATED WITH INDWELLING URETHRAL CATHETER, INITIAL ENCOUNTER: ICD-10-CM

## 2024-08-13 DIAGNOSIS — R31.9 URINARY TRACT INFECTION WITH HEMATURIA, SITE UNSPECIFIED: ICD-10-CM

## 2024-08-13 PROCEDURE — 99999 PR PBB SHADOW E&M-EST. PATIENT-LVL III: CPT | Mod: PBBFAC,,, | Performed by: UROLOGY

## 2024-08-13 PROCEDURE — 99214 OFFICE O/P EST MOD 30 MIN: CPT | Mod: S$GLB,,, | Performed by: UROLOGY

## 2024-08-13 PROCEDURE — 3008F BODY MASS INDEX DOCD: CPT | Mod: CPTII,S$GLB,, | Performed by: UROLOGY

## 2024-08-13 PROCEDURE — 3288F FALL RISK ASSESSMENT DOCD: CPT | Mod: CPTII,S$GLB,, | Performed by: UROLOGY

## 2024-08-13 PROCEDURE — 1159F MED LIST DOCD IN RCRD: CPT | Mod: CPTII,S$GLB,, | Performed by: UROLOGY

## 2024-08-13 PROCEDURE — 4010F ACE/ARB THERAPY RXD/TAKEN: CPT | Mod: CPTII,S$GLB,, | Performed by: UROLOGY

## 2024-08-13 PROCEDURE — 1101F PT FALLS ASSESS-DOCD LE1/YR: CPT | Mod: CPTII,S$GLB,, | Performed by: UROLOGY

## 2024-08-13 PROCEDURE — 1160F RVW MEDS BY RX/DR IN RCRD: CPT | Mod: CPTII,S$GLB,, | Performed by: UROLOGY

## 2024-08-13 RX ORDER — CEPHALEXIN 500 MG/1
500 CAPSULE ORAL EVERY 12 HOURS
Qty: 14 CAPSULE | Refills: 0 | Status: SHIPPED | OUTPATIENT
Start: 2024-08-13 | End: 2024-08-20

## 2024-08-13 NOTE — PROGRESS NOTES
"Subjective:       Patient ID: Ashish Swift is a 64 y.o. male who was referred by No ref. provider found    Chief Complaint:   No chief complaint on file.      Urinary Retention  He was seen recently at [facility] with retention.  There was a concern for bladder rupture.  Cystoscopy used to place Jefferson.  He is schedule for HoLEP at [facility] later this month.    He went to the ED yesterday at [facility] and his Jefferson was changed.  His daughter is here with him with concerns about follow up.        ACTIVE MEDICAL ISSUES:  Patient Active Problem List   Diagnosis    HTN (hypertension), age 44    Erectile dysfunction    Prostate hyperplasia with urinary obstruction    HCV s/p Harvoni but HAS NOT HAD LABS TO DOCUMENT CURE. still NEEDS HCV RNA as of 15    Family history of diabetes mellitus, two sisters    Elevated PSA, less than 10 ng/ml       PAST MEDICAL HISTORY  Past Medical History:   Diagnosis Date    HCV s/p Harvoni but HAS NOT HAD LABS TO DOCUMENT CURE. still NEEDS HCV RNA as of 15    Completed 8 wks Harvoni 12/18/15     Hypertension        PAST SURGICAL HISTORY:  Past Surgical History:   Procedure Laterality Date    APPENDECTOMY      COLONOSCOPY      LIVER BIOPSY      WRIST SURGERY         SOCIAL HISTORY:  Social History     Tobacco Use    Smoking status: Former     Current packs/day: 0.00     Types: Cigarettes     Start date: 1/15/1972     Quit date: 1/15/1980     Years since quittin.6    Smokeless tobacco: Never   Substance Use Topics    Alcohol use: No    Drug use: Yes     Types: Marijuana     Comment: "a couple of times a year"       FAMILY HISTORY:  Family History   Problem Relation Name Age of Onset    Hypertension Unknown          since age 46    Benign prostatic hyperplasia Unknown          finasteride 10 years    Erectile dysfunction Unknown          since age 45    Heart disease Father      Hypertension Mother      Cirrhosis Neg Hx         ALLERGIES AND MEDICATIONS: updated and " reviewed.  Review of patient's allergies indicates:   Allergen Reactions    Sulfa (sulfonamide antibiotics) Hives     Current Outpatient Medications   Medication Sig    amlodipine (NORVASC) 10 MG tablet Take 1 tablet (10 mg total) by mouth once daily.    butalbital-acetaminophen-caffeine -40 mg (FIORICET, ESGIC) -40 mg per tablet Take 1 tablet by mouth every 4 (four) hours as needed.    lisinopril (PRINIVIL,ZESTRIL) 40 MG tablet     lisinopriL (PRINIVIL,ZESTRIL) 40 MG tablet Take 40 mg by mouth once daily.    PLENVU 140-9-5.2 gram PPkS Take by mouth.    sildenafil (VIAGRA) 100 MG tablet TAKE 1 TABLET BY ORAL ROUTE 1 TIME PER DAY PRN 1 HOUR BEFORE SEX    cephALEXin (KEFLEX) 500 MG capsule Take 1 capsule (500 mg total) by mouth every 12 (twelve) hours. for 7 days    silodosin (RAPAFLO) 8 mg Cap capsule Take 1 capsule (8 mg total) by mouth once daily. (Patient not taking: Reported on 10/31/2022)     No current facility-administered medications for this visit.       Review of Systems   Constitutional:  Negative for chills and fever.   HENT:  Negative for congestion.    Respiratory:  Negative for chest tightness and shortness of breath.    Cardiovascular:  Negative for chest pain and palpitations.   Gastrointestinal:  Negative for abdominal pain, constipation, diarrhea, nausea and vomiting.   Genitourinary:  Positive for difficulty urinating and hematuria. Negative for dysuria, flank pain and urgency.   Musculoskeletal:  Negative for arthralgias.   Neurological:  Negative for dizziness.   Psychiatric/Behavioral:  Negative for confusion.        Objective:      Vitals:    08/13/24 0918   Weight: 70 kg (154 lb 5.2 oz)     Physical Exam  Vitals and nursing note reviewed.   Constitutional:       Appearance: He is well-developed.   HENT:      Head: Normocephalic.   Eyes:      Conjunctiva/sclera: Conjunctivae normal.   Neck:      Thyroid: No thyromegaly.      Trachea: No tracheal deviation.   Cardiovascular:       Rate and Rhythm: Normal rate.      Heart sounds: Normal heart sounds.   Pulmonary:      Effort: Pulmonary effort is normal. No respiratory distress.      Breath sounds: Normal breath sounds. No wheezing.   Abdominal:      General: Bowel sounds are normal.      Palpations: Abdomen is soft.      Tenderness: There is no abdominal tenderness. There is no rebound.      Hernia: No hernia is present.   Genitourinary:     Penis: Normal and circumcised.       Comments: 22 Fr Jefferson in place with thin red urine  Musculoskeletal:         General: No tenderness. Normal range of motion.      Cervical back: Normal range of motion and neck supple.   Lymphadenopathy:      Cervical: No cervical adenopathy.   Skin:     General: Skin is warm and dry.      Findings: No erythema or rash.   Neurological:      Mental Status: He is alert and oriented to person, place, and time.   Psychiatric:         Behavior: Behavior normal.         Thought Content: Thought content normal.         Judgment: Judgment normal.         Urine dipstick shows not done.  Micro exam: not done.    Assessment:       1. BPH with urinary obstruction    2. Urinary tract infection associated with indwelling urethral catheter, initial encounter    3. Elevated PSA          Plan:       1. BPH with urinary obstruction  I do not perform HoLEP  I will see if he can see Dr. Parekh soon.    - cephALEXin (KEFLEX) 500 MG capsule; Take 1 capsule (500 mg total) by mouth every 12 (twelve) hours. for 7 days  Dispense: 14 capsule; Refill: 0    2. Urinary tract infection associated with indwelling urethral catheter, initial encounter  I sent in Keflex for him since he has not had that filled yet    3. Elevated PSA  MRI in 2023 without worrisome lesions            Follow up in about 2 days (around 8/15/2024) for Follow up Established.

## 2024-08-13 NOTE — ED NOTES
Patient identifiers verified and correct for  Mr Swift  C/C: reports Jefferson cath leaking SEE NN  APPEARANCE: awake and alert in NAD. PAIN  10/10  SKIN: warm, dry and intact. No breakdown or bruising.  MUSCULOSKELETAL: Patient moving all extremities spontaneously, no obvious swelling or deformities noted. Ambulates independently.  RESPIRATORY: Denies shortness of breath.Respirations unlabored.   CARDIAC: Denies CP, 2+ distal pulses; no peripheral edema  ABDOMEN: S/ND/NT, Denies nausea  : voids spontaneously, arrives with dark urine in cath bag, min to no urine around cath   Neurologic: AAO x 4; follows commands equal strength in all extremities; denies numbness/tingling. Denies dizziness Denies new weakness

## 2024-08-13 NOTE — TELEPHONE ENCOUNTER
Pt was contacted pt informed of procedure date is after the original one given from other provider. Pt states he will like to cancel the appt and continue care with Dr. Malave.  ----- Message from Bob Parekh MD sent at 8/13/2024 10:25 AM CDT -----  It's fine, just clarify with patient that my next avail surgery date for HOLEP is not until September 10th which is after the surgery date they have with Dr. Malave. I do not want to waste their time if that date does not work for them.  ----- Message -----  From: Aneta Burton MA  Sent: 8/13/2024  10:17 AM CDT  To: Bob Parekh MD    I am only just now seeing this message. I scheduled pt for tomorrow. Would you like me to reschedule?  ----- Message -----  From: Bob Parekh MD  Sent: 8/13/2024   9:43 AM CDT  To: [facility]    Okay to put patient on my schedule for  Thursday to discuss HOLEP as OB pt.    Thanks  ----- Message -----  From: Tyler Arnold MD  Sent: 8/13/2024   9:39 AM CDT  To: Bob Parekh MD

## 2024-08-13 NOTE — ED PROVIDER NOTES
"Encounter Date: 2024       History     Chief Complaint   Patient presents with    Jefferson Cath leaking     Placed at [facility]     HPI    64-year-old male past medical history hypertension, BPH who presents to the ER for evaluation of a Jefferson problem.  Patient has had a Jefferson in for BPH for several weeks.  He last had to have it replaced at [facility] by urology over a wire because of a false tract that had occurred on prior placement.  He presents today with x2 days of leakage around the distal site.  No inciting events that he is aware of.  He denies any fevers, chills, abdominal pain, nausea, vomiting, or syncope.      Review of patient's allergies indicates:   Allergen Reactions    Sulfa (sulfonamide antibiotics) Hives     Past Medical History:   Diagnosis Date    HCV s/p Harvoni but HAS NOT HAD LABS TO DOCUMENT CURE. still NEEDS HCV RNA as of 16    Completed 8 wks Harvoni 12/18/15     Hypertension      Past Surgical History:   Procedure Laterality Date    APPENDECTOMY      COLONOSCOPY      LIVER BIOPSY      WRIST SURGERY       Family History   Problem Relation Name Age of Onset    Hypertension Unknown          since age 45    Benign prostatic hyperplasia Unknown          finasteride 10 years    Erectile dysfunction Unknown          since age 45    Heart disease Father      Hypertension Mother      Cirrhosis Neg Hx       Social History     Tobacco Use    Smoking status: Former     Current packs/day: 0.00     Types: Cigarettes     Start date: 1/15/1972     Quit date: 1/15/1980     Years since quittin.6    Smokeless tobacco: Never   Substance Use Topics    Alcohol use: No    Drug use: Yes     Types: Marijuana     Comment: "a couple of times a year"     Review of Systems   Constitutional:  Negative for fever.   Genitourinary:  Positive for difficulty urinating.   Neurological:  Negative for syncope.       Physical Exam     Initial Vitals [24 1751]   BP Pulse Resp Temp SpO2   (!) " 106/59 78 20 98.8 °F (37.1 °C) 98 %      MAP       --         Physical Exam    Nursing note and vitals reviewed.  Constitutional: He appears well-nourished.   Cardiovascular:  Normal rate and regular rhythm.           Pulmonary/Chest: No respiratory distress.   Abdominal: Abdomen is soft. There is no abdominal tenderness.   Genitourinary:    Genitourinary Comments: Jefferson in place with no obvious drainage, dark urine in the bag,     Musculoskeletal:         General: No edema. Normal range of motion.     Neurological: He is alert and oriented to person, place, and time.   Skin: Skin is warm and dry.   Psychiatric: He has a normal mood and affect. Thought content normal.         ED Course   Procedures  Labs Reviewed   CBC W/ AUTO DIFFERENTIAL - Abnormal       Result Value    WBC 6.11      RBC 4.26 (*)     Hemoglobin 11.8 (*)     Hematocrit 37.2 (*)     MCV 87      MCH 27.7      MCHC 31.7 (*)     RDW 15.2 (*)     Platelets 346      MPV 9.5      Immature Granulocytes 0.3      Gran # (ANC) 3.2      Immature Grans (Abs) 0.02      Lymph # 1.7      Mono # 0.9      Eos # 0.2      Baso # 0.03      nRBC 0      Gran % 52.9      Lymph % 28.2      Mono % 14.2      Eosinophil % 3.9      Basophil % 0.5      Differential Method Automated     COMPREHENSIVE METABOLIC PANEL - Abnormal    Sodium 146 (*)     Potassium 4.0      Chloride 108      CO2 28      Glucose 84      BUN 14      Creatinine 1.1      Calcium 9.1      Total Protein 6.6      Albumin 3.6      Total Bilirubin 0.3      Alkaline Phosphatase 45 (*)     AST 17      ALT 25      eGFR >60.0      Anion Gap 10     URINALYSIS, REFLEX TO URINE CULTURE - Abnormal    Specimen UA Urine, Catheterized      Color, UA Brown (*)     Appearance, UA Hazy (*)     pH, UA 8.0      Specific Gravity, UA 1.010      Protein, UA Trace (*)     Glucose, UA Negative      Ketones, UA Negative      Bilirubin (UA) Negative      Occult Blood UA 3+ (*)     Nitrite, UA Negative      Leukocytes, UA Negative       Narrative:     Specimen Source->Urine   URINALYSIS MICROSCOPIC - Abnormal    RBC, UA >100 (*)     WBC, UA 11 (*)     Bacteria Rare      Microscopic Comment SEE COMMENT      Narrative:     Specimen Source->Urine   CULTURE, URINE          Imaging Results    None          Medications - No data to display  Medical Decision Making  Amount and/or Complexity of Data Reviewed  External Data Reviewed: notes.  Labs: ordered. Decision-making details documented in ED Course.    Risk  Prescription drug management.                                  64-year-old male presenting with a Jefferson problem.    Vital signs stable in emergency room, abdomen is soft, Jefferson not draining.  EMR notes were reviewed, he was a complicated Jefferson placement that last time required urology to use a wire.  Discussed with urology on-call, they evaluated the patient bedside and replaced Jefferson.  See their note.  UA was sent from the new catheter.  Many RBCs, some WBCs.  We will empirically treat for infection.  Keflex for home, he has an appointment with Urology already.  Strict return precautions were given, all questions answered, discharged home.      Clinical Impression:  Final diagnoses:  [T83.9XXA] Problem with Jefferson catheter, initial encounter (Primary)  [N39.0, R31.9] Urinary tract infection with hematuria, site unspecified          ED Disposition Condition    Discharge           ED Prescriptions       Medication Sig Dispense Start Date End Date Auth. Provider    cephALEXin (KEFLEX) 500 MG capsule Take 1 capsule (500 mg total) by mouth every 12 (twelve) hours. for 7 days 14 capsule 8/12/2024 8/19/2024 Joseph Chase MD          Follow-up Information    None          Joseph Chase MD  08/12/24 [id number]

## 2024-08-13 NOTE — ED NOTES
Patient states Jefferson cath is leaking, still in place, wearing brief with min output on brief x 4 hours

## 2024-08-13 NOTE — SUBJECTIVE & OBJECTIVE
"Past Medical History:   Diagnosis Date    HCV s/p Harvoni but HAS NOT HAD LABS TO DOCUMENT CURE. still NEEDS HCV RNA as of 16    Completed 8 wks Harvoni 12/18/15     Hypertension        Past Surgical History:   Procedure Laterality Date    APPENDECTOMY      COLONOSCOPY      LIVER BIOPSY      WRIST SURGERY         Review of patient's allergies indicates:   Allergen Reactions    Sulfa (sulfonamide antibiotics) Hives       Family History       Problem Relation (Age of Onset)    Benign prostatic hyperplasia     Erectile dysfunction     Heart disease Father    Hypertension Mother            Tobacco Use    Smoking status: Former     Current packs/day: 0.00     Types: Cigarettes     Start date: 1/15/1972     Quit date: 1/15/1980     Years since quittin.6    Smokeless tobacco: Never   Substance and Sexual Activity    Alcohol use: No    Drug use: Yes     Types: Marijuana     Comment: "a couple of times a year"    Sexual activity: Yes     Partners: Female       Review of Systems    Objective:     Temp:  [98.8 °F (37.1 °C)-98.9 °F (37.2 °C)] 98.9 °F (37.2 °C)  Pulse:  [78-79] 79  Resp:  [18-20] 18  SpO2:  [98 %] 98 %  BP: (106-131)/(59-73) 131/73  Weight: 72.6 kg (160 lb)  Body mass index is 24.33 kg/m².           Drains       Drain  Duration                  Urethral Catheter 24 Non-latex 22 Fr. <1 day                     Physical Exam  Constitutional:       Appearance: Normal appearance.   Abdominal:      General: There is no distension.      Palpations: Abdomen is soft.      Tenderness: There is no abdominal tenderness.   Genitourinary:     Comments: 16 fr parr in place, small amount of simone urine with sediment in parr bag.  Catheter did not irrigate well at bedside.    Neurological:      Mental Status: He is alert.          Significant Labs:    BMP:  Recent Labs   Lab 24  1915   *   K 4.0      CO2 28   BUN 14   CREATININE 1.1   CALCIUM 9.1       CBC:  Recent Labs   Lab " 08/12/24 1915   WBC 6.11   HGB 11.8*   HCT 37.2*          All pertinent labs results from the past 24 hours have been reviewed.    Significant Imaging:  All pertinent imaging results/findings from the past 24 hours have been reviewed.

## 2024-08-13 NOTE — CONSULTS
"[facility] - Emergency Dept  Urology  Consult Note    Patient Name: Ashish Swift  MRN: [id number]  Admission Date: 8/12/2024  Hospital Length of Stay: 0   Code Status: No Order   Attending Provider: Joseph Chase MD   Consulting Provider: Krystin Schroeder MD  Primary Care Physician: Vi Valdivia NP  Principal Problem:<principal problem not specified>    Inpatient consult to Urology  Consult performed by: Krystin Schroeder MD  Consult ordered by: Joseph Chase MD  Reason for consult: parr exchange over a wire, clogged catheter          Subjective:     HPI:  64M w/ hx of BPH (prostate volume 129 ccs) and prior hx of urinary retention presents to the ED with leakage around his parr catheter and decreased drainage from catheter.     Parr catheter initially placed  2 weeks ago at  for urinary retention.  Per chart review, patient had multiple parr attempts and parr balloon was inflated in the prostatic urethra.  Urology then scoped in a catheter and noted multiple false passages.  Per report, CTAP obtained at that time showed concern for intraperitoneal bladder perforation.  Urology had low suspicion for bladder rupture "given distention of bladder on CT scan and amount of fluid in the bladder upon catheter placement." Air/fluid seen in the scrotum and lower abdomen at that time was attributed to urethral trauma.   Patient was discharged with parr catheter in place and scheduled for an outpatient Holep at  on 8/29.      He then represented to the ED on 8/6 for clogged parr.  Catheter was flushed at that time with return of sediment but no hematuria/clots and he was discharged.   He presented to [facility] ED today for leakage around his parr catheter and decreased catheter drainage today.  Denies fevers, does report some intermittent subjective chills.  Reports intermittent hematuria since catheter was placed but none recently.   Denies abdominal, flank pain.        In the ED he is AF VSS.  No leukocytosis, creatinine " "at baseline, 1.1.  No new imaging obtained.    Past Medical History:   Diagnosis Date    HCV s/p Harvoni but HAS NOT HAD LABS TO DOCUMENT CURE. still NEEDS HCV RNA as of 15    Completed 8 wks Harvoni 12/18/15     Hypertension        Past Surgical History:   Procedure Laterality Date    APPENDECTOMY      COLONOSCOPY      LIVER BIOPSY      WRIST SURGERY         Review of patient's allergies indicates:   Allergen Reactions    Sulfa (sulfonamide antibiotics) Hives       Family History       Problem Relation (Age of Onset)    Benign prostatic hyperplasia     Erectile dysfunction     Heart disease Father    Hypertension Mother            Tobacco Use    Smoking status: Former     Current packs/day: 0.00     Types: Cigarettes     Start date: 1/15/1972     Quit date: 1/15/1980     Years since quittin.6    Smokeless tobacco: Never   Substance and Sexual Activity    Alcohol use: No    Drug use: Yes     Types: Marijuana     Comment: "a couple of times a year"    Sexual activity: Yes     Partners: Female       Review of Systems    Objective:     Temp:  [98.8 °F (37.1 °C)-98.9 °F (37.2 °C)] 98.9 °F (37.2 °C)  Pulse:  [78-79] 79  Resp:  [18-20] 18  SpO2:  [98 %] 98 %  BP: (106-131)/(59-73) 131/73  Weight: 72.6 kg (160 lb)  Body mass index is 24.33 kg/m².           Drains       Drain  Duration                  Urethral Catheter 24 Non-latex 22 Fr. <1 day                     Physical Exam  Constitutional:       Appearance: Normal appearance.   Abdominal:      General: There is no distension.      Palpations: Abdomen is soft.      Tenderness: There is no abdominal tenderness.   Genitourinary:     Comments: 16 fr parr in place, small amount of simone urine with sediment in parr bag.  Catheter did not irrigate well at bedside.    Neurological:      Mental Status: He is alert.          Significant Labs:    BMP:  Recent Labs   Lab 24  1915   *   K 4.0      CO2 28   BUN 14   CREATININE 1.1 "   CALCIUM 9.1       CBC:  Recent Labs   Lab 08/12/24 1915   WBC 6.11   HGB 11.8*   HCT 37.2*          All pertinent labs results from the past 24 hours have been reviewed.    Significant Imaging:  All pertinent imaging results/findings from the past 24 hours have been reviewed.                    Assessment and Plan:     Prostate hyperplasia with urinary obstruction  - 16 [place] tip Jefferson catheter exchanged over a wire at bedside for a 22 [place] tip Jefferson catheter.  Sediment/small clot noted at tip of 16 [place] Jefferson on exchange.  - small amount of clot irrigated from the bladder after catheter exchange.  Irrigated to blush.  - okay for discharge from the ED from urologic perspective  - recommend discharge with prophylactic antibiotics in light of catheter exchange over a wire and upcoming urologic procedure  - UA obtained from new Jefferson, follow-up urine culture  - maintain follow-up at  as scheduled for outpatient HoLEP procedure        VTE Risk Mitigation (From admission, onward)      None            Thank you for your consult. I will sign off. Please contact us if you have any additional questions.    Krystin Schroeder MD  Urology  [facility] - Emergency Dept

## 2024-08-13 NOTE — ED NOTES
Assessed Jefferson cath, states he emptied it at 1630, no drainage or urine noted around Jefferson cath,  approx 100 ml in bag, per family pateint had Jefferson cath placed per Urology at [facility]

## 2024-08-13 NOTE — ASSESSMENT & PLAN NOTE
- 16 [place] tip Jefferson catheter exchanged over a wire at bedside for a 22 [place] tip Jefferson catheter.  Sediment/small clot noted at tip of 16 [place] Jefferson on exchange.  - small amount of clot irrigated from the bladder after catheter exchange.  Irrigated to blush.  - okay for discharge from the ED from urologic perspective  - recommend discharge with prophylactic antibiotics in light of catheter exchange over a wire and upcoming urologic procedure  - UA obtained from new Jefferson, follow-up urine culture  - maintain follow-up at  as scheduled for outpatient HoLEP procedure

## 2024-08-13 NOTE — HPI
"64M w/ hx of BPH and prior hx of urinary retention presents to the ED with leakage around his parr catheter and decreased drainage from catheter.     Parr catheter initially placed  2 weeks ago at  for urinary retention.  Per chart review, patient had multiple parr attempts and parr balloon was inflated in the prostatic urethra.  Urology then scoped in a catheter and noted multiple false passages.  Per report, CTAP obtained at that time showed concern for intraperitoneal bladder perforation.  Urology had low suspicion for bladder rupture "given distention of bladder on CT scan and amount of fluid in the bladder upon catheter placement." Air/fluid seen in the scrotum and lower abdomen at that time was attributed to urethral trauma.   Patient was discharged with parr catheter in place and scheduled for an outpatient Holep at  on 8/29.      He then represented to the ED on 8/6 for clogged parr.  Catheter was flushed at that time with return of sediment but no hematuria/clots and he was discharged.   He presented to [facility] ED today for leakage around his parr catheter and decreased catheter drainage today.  Denies fevers, does report some intermittent subjective chills.  Reports intermittent hematuria since catheter was placed but none recently.   Denies abdominal, flank pain.        In the ED he is AF VSS.  No leukocytosis, creatinine at baseline, 1.1.  No new imaging obtained.  "

## 2024-08-14 LAB — BACTERIA UR CULT: NO GROWTH
